# Patient Record
Sex: MALE | Race: WHITE | NOT HISPANIC OR LATINO | Employment: FULL TIME | URBAN - METROPOLITAN AREA
[De-identification: names, ages, dates, MRNs, and addresses within clinical notes are randomized per-mention and may not be internally consistent; named-entity substitution may affect disease eponyms.]

---

## 2018-10-26 ENCOUNTER — TRANSFERRED RECORDS (OUTPATIENT)
Dept: HEALTH INFORMATION MANAGEMENT | Facility: CLINIC | Age: 33
End: 2018-10-26

## 2018-10-26 LAB
CHOLEST SERPL-MCNC: 267 MG/DL (ref 0–199)
CREAT SERPL-MCNC: 1.12 MG/DL (ref 0.73–1.18)
GFR SERPL CREATININE-BSD FRML MDRD: >60 ML/MIN/1.73M2
GLUCOSE SERPL-MCNC: 100 MG/DL (ref 70–100)
HBA1C MFR BLD: 5.3 % (ref 4–5.6)
HDLC SERPL-MCNC: 57 MG/DL
LDLC SERPL CALC-MCNC: 181 MG/DL (ref 19–130)
NONHDLC SERPL-MCNC: 210 MG/DL (ref 0–159)
POTASSIUM SERPL-SCNC: 4.3 MMOL/L (ref 3.5–5.2)
TRIGL SERPL-MCNC: 144 MG/DL (ref 4–149)

## 2019-03-08 ENCOUNTER — OFFICE VISIT (OUTPATIENT)
Dept: FAMILY MEDICINE | Facility: CLINIC | Age: 34
End: 2019-03-08
Payer: COMMERCIAL

## 2019-03-08 VITALS
OXYGEN SATURATION: 97 % | HEIGHT: 73 IN | SYSTOLIC BLOOD PRESSURE: 138 MMHG | DIASTOLIC BLOOD PRESSURE: 89 MMHG | BODY MASS INDEX: 31.05 KG/M2 | TEMPERATURE: 96.5 F | WEIGHT: 234.3 LBS | HEART RATE: 94 BPM

## 2019-03-08 DIAGNOSIS — F31.81 BIPOLAR 2 DISORDER (H): ICD-10-CM

## 2019-03-08 DIAGNOSIS — Z00.00 ROUTINE GENERAL MEDICAL EXAMINATION AT A HEALTH CARE FACILITY: Primary | ICD-10-CM

## 2019-03-08 PROCEDURE — 99385 PREV VISIT NEW AGE 18-39: CPT | Performed by: INTERNAL MEDICINE

## 2019-03-08 RX ORDER — QUETIAPINE FUMARATE 50 MG/1
100 TABLET, FILM COATED ORAL AT BEDTIME
Qty: 60 TABLET | Refills: 1 | Status: SHIPPED | OUTPATIENT
Start: 2019-03-08 | End: 2019-05-07

## 2019-03-08 RX ORDER — QUETIAPINE FUMARATE 50 MG/1
50 TABLET, FILM COATED ORAL 2 TIMES DAILY
COMMUNITY

## 2019-03-08 SDOH — HEALTH STABILITY: MENTAL HEALTH: HOW OFTEN DO YOU HAVE A DRINK CONTAINING ALCOHOL?: 2-3 TIMES A WEEK

## 2019-03-08 ASSESSMENT — ANXIETY QUESTIONNAIRES
1. FEELING NERVOUS, ANXIOUS, OR ON EDGE: MORE THAN HALF THE DAYS
2. NOT BEING ABLE TO STOP OR CONTROL WORRYING: MORE THAN HALF THE DAYS
6. BECOMING EASILY ANNOYED OR IRRITABLE: MORE THAN HALF THE DAYS
3. WORRYING TOO MUCH ABOUT DIFFERENT THINGS: MORE THAN HALF THE DAYS
7. FEELING AFRAID AS IF SOMETHING AWFUL MIGHT HAPPEN: NOT AT ALL
IF YOU CHECKED OFF ANY PROBLEMS ON THIS QUESTIONNAIRE, HOW DIFFICULT HAVE THESE PROBLEMS MADE IT FOR YOU TO DO YOUR WORK, TAKE CARE OF THINGS AT HOME, OR GET ALONG WITH OTHER PEOPLE: SOMEWHAT DIFFICULT
GAD7 TOTAL SCORE: 12
5. BEING SO RESTLESS THAT IT IS HARD TO SIT STILL: SEVERAL DAYS

## 2019-03-08 ASSESSMENT — MIFFLIN-ST. JEOR: SCORE: 2061.66

## 2019-03-08 ASSESSMENT — PATIENT HEALTH QUESTIONNAIRE - PHQ9
5. POOR APPETITE OR OVEREATING: NEARLY EVERY DAY
SUM OF ALL RESPONSES TO PHQ QUESTIONS 1-9: 12

## 2019-03-08 NOTE — PROGRESS NOTES
SUBJECTIVE:   CC: Eddy Dey is an 33 year old male who presents for preventive health visit.       New patient evaluation    Rectal bleeding  Patient reports 2 years of intermittent diarrhea alternating with constipation with associated bright red blood on the toilet tissue with more frequent bowel movements.  He has no  associated abdominal pain or discomfort  Bipolar disorder  Alternating anxiety/depression  Functioning well    Family history  Father-bipolar disorder  Mother-hep C  Brother-depression    Healthy Habits:    Do you get at least three servings of calcium containing foods daily (dairy, green leafy vegetables, etc.)? yes    Amount of exercise or daily activities, outside of work: 3-4 day(s) per week    Problems taking medications regularly No    Medication side effects: No    Have you had an eye exam in the past two years? no    Do you see a dentist twice per year? yes    Do you have sleep apnea, excessive snoring or daytime drowsiness?no        Today's PHQ-2 Score: No flowsheet data found.    Abuse: Current or Past(Physical, Sexual or Emotional)- No  Do you feel safe in your environment? Yes    Social History     Tobacco Use     Smoking status: Not on file   Substance Use Topics     Alcohol use: Not on file     If you drink alcohol do you typically have >3 drinks per day or >7 drinks per week?                       Last PSA: No results found for: PSA    Reviewed orders with patient. Reviewed health maintenance and updated orders accordingly - Yes  Current Outpatient Medications   Medication Sig Dispense Refill     QUEtiapine (SEROQUEL) 50 MG tablet Take 50 mg by mouth 2 times daily       QUEtiapine (SEROQUEL) 50 MG tablet Take 2 tablets (100 mg) by mouth At Bedtime for 30 doses 60 tablet 1     Not on File    Reviewed and updated as needed this visit by clinical staff         Reviewed and updated as needed this visit by Provider            ROS:  CONSTITUTIONAL: NEGATIVE for fever, chills,  "change in weight  INTEGUMENTARY/SKIN: NEGATIVE for worrisome rashes, moles or lesions  EYES: NEGATIVE for vision changes or irritation  ENT: NEGATIVE for ear, mouth and throat problems  RESP: NEGATIVE for significant cough or SOB  CV: NEGATIVE for chest pain, palpitations or peripheral edema  Seasonal activity however he is generally sedentary.  He reports occasional stinging chest discomfort without associated activity which is probably related with breathing and stress  GI: NEGATIVE for nausea, abdominal pain, heartburn, or change in bowel habits, stress-induced diarrhea, alternating diarrhea and constipation   male: negative for dysuria, hematuria, decreased urinary stream, erectile dysfunction, urethral discharge  MUSCULOSKELETAL: NEGATIVE for significant arthralgias or myalgia except and shoulders  NEURO: NEGATIVE for weakness, dizziness or paresthesias  PSYCHIATRIC: NEGATIVE for changes in mood or affect    OBJECTIVE:   There were no vitals taken for this visit.   /89 (BP Location: Left arm, Patient Position: Sitting, Cuff Size: Adult Large)   Pulse 94   Temp 96.5  F (35.8  C) (Oral)   Ht 1.854 m (6' 1\")   Wt 106.3 kg (234 lb 4.8 oz)   SpO2 97%   BMI 30.91 kg/m      EXAM:  GENERAL: healthy, alert and no distress  EYES: Eyes grossly normal to inspection, PERRL and conjunctivae and sclerae normal  HENT: ear canals and TM's normal, nose and mouth without ulcers or lesions  NECK: no adenopathy, no asymmetry, masses, or scars and thyroid normal to palpation  RESP: lungs clear to auscultation - no rales, rhonchi or wheezes  CV: regular rate and rhythm, normal S1 S2, no S3 or S4, no murmur, click or rub, no peripheral edema and peripheral pulses strong  ABDOMEN: soft, nontender, no hepatosplenomegaly, no masses and bowel sounds normal   there is no inguinal masses or adenopathy or signs of an inguinal hernia testicles were distended bilaterally  Rectal exam his anus was normal there are no palpable " masses or alexandre hemorrhoids in the joana-rectal area his prostate was 1+ and smooth  MS: no gross musculoskeletal defects noted, no edema  SKIN: no suspicious lesions or rashes  NEURO: Normal strength and tone, mentation intact and speech normal  PSYCH: mentation appears normal, affect normal/bright        ASSESSMENT/PLAN:   1. Routine general medical examination at a health care facility    - **CBC with platelets FUTURE anytime; Future  - **Comprehensive metabolic panel FUTURE anytime; Future  - Lipid panel reflex to direct LDL Fasting; Future    2. Bipolar 2 disorder (H)    - QUEtiapine (SEROQUEL) 50 MG tablet; Take 2 tablets (100 mg) by mouth At Bedtime for 30 doses  Dispense: 60 tablet; Refill: 1    COUNSELING:  Reevaluate after lab reports are available.  Patient will return to clinic fasting for lab studies    BP Readings from Last 1 Encounters:   No data found for BP     There is no height or weight on file to calculate BMI.           has no tobacco history on file.      Counseling Resources:  ATP IV Guidelines  Pooled Cohorts Equation Calculator  FRAX Risk Assessment  ICSI Preventive Guidelines  Dietary Guidelines for Americans, 2010  USDA's MyPlate  ASA Prophylaxis  Lung CA Screening    Kvng Shah MD  [facility]

## 2019-03-09 ASSESSMENT — ANXIETY QUESTIONNAIRES: GAD7 TOTAL SCORE: 12

## 2019-03-14 ENCOUNTER — TELEPHONE (OUTPATIENT)
Dept: FAMILY MEDICINE | Facility: CLINIC | Age: 34
End: 2019-03-14

## 2019-03-14 NOTE — TELEPHONE ENCOUNTER
Dr. Shah-     Will this be a procedure only colonscopy or referral to GI.     Please advise,     Thank you,   Tessa BARTLETT RN

## 2019-03-14 NOTE — TELEPHONE ENCOUNTER
Reason for Call: Request for an order or referral:    Order or referral being requested: Colonoscopy.  And he is wondering if there are any other labs Dr. Shah wanted to order    Date needed: as soon as possible    Has the patient been seen by the PCP for this problem? YES    Additional comments: per the discussion at his physical    Phone number Patient can be reached at:  Home number on file [phone] (home)    Best Time:  any    Can we leave a detailed message on this number?  YES    Call taken on 3/14/2019 at 1:13 PM by Seda Garcia

## 2019-04-10 ENCOUNTER — TELEPHONE (OUTPATIENT)
Dept: FAMILY MEDICINE | Facility: CLINIC | Age: 34
End: 2019-04-10

## 2019-04-10 DIAGNOSIS — K62.5 RECTAL BLEEDING: Primary | ICD-10-CM

## 2019-04-10 NOTE — TELEPHONE ENCOUNTER
Reason for Call:  Other call back    Detailed comments:  ordered labs, but PT had just had some labs done, and PT told me that DR was going to check to see if these previous labs would suffice for what he ordered.     Phone Number Patient can be reached at: Cell number on file:    Telephone Information:   Mobile [phone]       Best Time: any    Can we leave a detailed message on this number? YES    Call taken on 4/10/2019 at 12:23 PM by Haylie Bonilla

## 2019-04-16 NOTE — TELEPHONE ENCOUNTER
Pended colonoscopy for Dr. Shah, Please sign today.     Still waiting for outside labs from [facility]. Pt did already sign all release forms.    Neha Dorsey RN

## 2019-04-16 NOTE — TELEPHONE ENCOUNTER
Patient called said this is his 4th call trying to talk with Dr. Shah or a nurse  Regarding labs and Colonoscopy.

## 2019-05-07 DIAGNOSIS — F31.81 BIPOLAR 2 DISORDER (H): ICD-10-CM

## 2019-05-07 NOTE — TELEPHONE ENCOUNTER
"QUEtiapine (SEROQUEL) 50 MG tablet 60 tablet 1 3/8/2019     Last Written Prescription Date:  3/8/19  Last Fill Quantity: 60,  # refills: 1   Last office visit: 3/8/2019 with prescribing provider:  Alyssa   Future Office Visit:  None    Requested Prescriptions   Pending Prescriptions Disp Refills     QUEtiapine (SEROQUEL) 50 MG tablet [Pharmacy Med Name: QUEtiapine Fumarate Oral Tablet 50 MG] 60 tablet 0     Sig: Take 2 tablets (100 mg) by mouth At Bedtime       Antipsychotic Medications Failed - 5/7/2019  7:01 AM        Failed - Lipid panel on file within the past 12 months     No lab results found.            Failed - CBC on file in past 12 months     No lab results found.              Failed - A1c or Glucose on file in past 12 months     No lab results found.    Please review patients last 3 weights. If a weight gain of >10 lbs exists, you may refill the prescription once after instructing the patient to schedule an appointment within the next 30 days.    Wt Readings from Last 3 Encounters:   03/08/19 106.3 kg (234 lb 4.8 oz)             Passed - Blood pressure under 140/90 in past 12 months     BP Readings from Last 3 Encounters:   03/08/19 138/89                 Passed - Patient is 12 years of age or older        Passed - Heart Rate on file within past 12 months     Pulse Readings from Last 3 Encounters:   03/08/19 94               Passed - Medication is active on med list        Passed - Recent (6 mo) or future (30 days) visit within the authorizing provider's specialty     Patient had office visit in the last 6 months or has a visit in the next 30 days with authorizing provider or within the authorizing provider's specialty.  See \"Patient Info\" tab in inbasket, or \"Choose Columns\" in Meds & Orders section of the refill encounter.            [facility] Follow-up to PHQ 3/8/2019   PHQ-9 9. Suicide Ideation past 2 weeks More than half the days         "

## 2019-05-08 NOTE — TELEPHONE ENCOUNTER
Routing refill request to provider for review/approval because:  Labs not current in Epic ([facility]- [facility] labs are scanned):  Lipid, CBC, A1C/Glucose       Please review and authorize if appropriate,     Thank you,   Tessa JUAREZ RN

## 2019-05-09 RX ORDER — QUETIAPINE FUMARATE 50 MG/1
TABLET, FILM COATED ORAL
Qty: 60 TABLET | Refills: 1 | Status: SHIPPED | OUTPATIENT
Start: 2019-05-09

## 2019-05-14 DIAGNOSIS — F31.81 BIPOLAR 2 DISORDER (H): ICD-10-CM

## 2019-05-15 RX ORDER — QUETIAPINE FUMARATE 50 MG/1
100 TABLET, FILM COATED ORAL AT BEDTIME
Qty: 60 TABLET | Refills: 1 | Status: SHIPPED | OUTPATIENT
Start: 2019-05-15 | End: 2019-07-15

## 2019-05-15 NOTE — TELEPHONE ENCOUNTER
"Unsure if Rx faxed 5/9/2019  Pharmacy requesting refill again  eRx'd instead  Alysha GONZÁLES RN    Last Written Prescription Date:  5/6/2019 (local printed)  Last Fill Quantity: 60,  # refills: 1   Last office visit: 3/8/2019 with prescribing provider:     Future Office Visit:    Requested Prescriptions   Pending Prescriptions Disp Refills     QUEtiapine (SEROQUEL) 50 MG tablet [Pharmacy Med Name: QUEtiapine Fumarate Oral Tablet 50 MG] 60 tablet 0     Sig: Take 2 tablets (100 mg) by mouth At Bedtime       Antipsychotic Medications Failed - 5/14/2019  8:06 PM        Failed - CBC on file in past 12 months     No lab results found.              Passed - Blood pressure under 140/90 in past 12 months     BP Readings from Last 3 Encounters:   03/08/19 138/89                 Passed - Patient is 12 years of age or older        Passed - Lipid panel on file within the past 12 months     Recent Labs   Lab Test 10/26/18   CHOL 267*   TRIG 144   HDL 57   *   NHDL 210*               Passed - Heart Rate on file within past 12 months     Pulse Readings from Last 3 Encounters:   03/08/19 94               Passed - A1c or Glucose on file in past 12 months     Recent Labs   Lab Test 10/26/18      A1C 5.3       Please review patients last 3 weights. If a weight gain of >10 lbs exists, you may refill the prescription once after instructing the patient to schedule an appointment within the next 30 days.    Wt Readings from Last 3 Encounters:   03/08/19 106.3 kg (234 lb 4.8 oz)             Passed - Medication is active on med list        Passed - Recent (6 mo) or future (30 days) visit within the authorizing provider's specialty     Patient had office visit in the last 6 months or has a visit in the next 30 days with authorizing provider or within the authorizing provider's specialty.  See \"Patient Info\" tab in inbasket, or \"Choose Columns\" in Meds & Orders section of the refill encounter.              "

## 2019-05-17 ENCOUNTER — HOSPITAL ENCOUNTER (OUTPATIENT)
Facility: CLINIC | Age: 34
Discharge: HOME | End: 2019-05-17
Attending: SURGERY | Admitting: SURGERY
Payer: COMMERCIAL

## 2019-05-17 VITALS
HEIGHT: 74 IN | RESPIRATION RATE: 17 BRPM | SYSTOLIC BLOOD PRESSURE: 125 MMHG | BODY MASS INDEX: 29.52 KG/M2 | DIASTOLIC BLOOD PRESSURE: 93 MMHG | WEIGHT: 230 LBS | HEART RATE: 70 BPM | OXYGEN SATURATION: 97 %

## 2019-05-17 LAB
COLONOSCOPY: NORMAL
GI HISTORY AND PHYSICALL: NORMAL

## 2019-05-17 PROCEDURE — 88305 TISSUE EXAM BY PATHOLOGIST: CPT | Mod: 26 | Performed by: SURGERY

## 2019-05-17 PROCEDURE — 88305 TISSUE EXAM BY PATHOLOGIST: CPT | Performed by: SURGERY

## 2019-05-17 PROCEDURE — 45380 COLONOSCOPY AND BIOPSY: CPT | Performed by: SURGERY

## 2019-05-17 PROCEDURE — G0500 MOD SEDAT ENDO SERVICE >5YRS: HCPCS | Performed by: SURGERY

## 2019-05-17 PROCEDURE — 25000128 H RX IP 250 OP 636: Performed by: SURGERY

## 2019-05-17 RX ORDER — ONDANSETRON 4 MG/1
4 TABLET, ORALLY DISINTEGRATING ORAL EVERY 6 HOURS PRN
Status: DISCONTINUED | OUTPATIENT
Start: 2019-05-17 | End: 2019-05-17 | Stop reason: HOSPADM

## 2019-05-17 RX ORDER — NALOXONE HYDROCHLORIDE 0.4 MG/ML
.1-.4 INJECTION, SOLUTION INTRAMUSCULAR; INTRAVENOUS; SUBCUTANEOUS
Status: DISCONTINUED | OUTPATIENT
Start: 2019-05-17 | End: 2019-05-17 | Stop reason: HOSPADM

## 2019-05-17 RX ORDER — FLUMAZENIL 0.1 MG/ML
0.2 INJECTION, SOLUTION INTRAVENOUS
Status: DISCONTINUED | OUTPATIENT
Start: 2019-05-17 | End: 2019-05-17 | Stop reason: HOSPADM

## 2019-05-17 RX ORDER — LIDOCAINE 40 MG/G
CREAM TOPICAL
Status: DISCONTINUED | OUTPATIENT
Start: 2019-05-17 | End: 2019-05-17 | Stop reason: HOSPADM

## 2019-05-17 RX ORDER — ONDANSETRON 2 MG/ML
4 INJECTION INTRAMUSCULAR; INTRAVENOUS EVERY 6 HOURS PRN
Status: DISCONTINUED | OUTPATIENT
Start: 2019-05-17 | End: 2019-05-17 | Stop reason: HOSPADM

## 2019-05-17 RX ORDER — ONDANSETRON 2 MG/ML
4 INJECTION INTRAMUSCULAR; INTRAVENOUS
Status: DISCONTINUED | OUTPATIENT
Start: 2019-05-17 | End: 2019-05-17 | Stop reason: HOSPADM

## 2019-05-17 RX ORDER — FENTANYL CITRATE 50 UG/ML
INJECTION, SOLUTION INTRAMUSCULAR; INTRAVENOUS PRN
Status: DISCONTINUED | OUTPATIENT
Start: 2019-05-17 | End: 2019-05-17 | Stop reason: HOSPADM

## 2019-05-17 ASSESSMENT — MIFFLIN-ST. JEOR: SCORE: 2058.02

## 2019-05-20 LAB — COPATH REPORT: NORMAL

## 2019-07-15 DIAGNOSIS — F31.81 BIPOLAR 2 DISORDER (H): ICD-10-CM

## 2019-07-17 RX ORDER — QUETIAPINE FUMARATE 50 MG/1
TABLET, FILM COATED ORAL
Qty: 60 TABLET | Refills: 0 | Status: SHIPPED | OUTPATIENT
Start: 2019-07-17

## 2019-07-17 NOTE — TELEPHONE ENCOUNTER
"Dr. Shah,  Routing refill request to provider for review/approval because:  BP = 125/93  Last Rx 5/15/19 for #60 with 1 refill.  Last OV 3/8/19  Thanks,  Khadijah Fuller RN                  Requested Prescriptions   Pending Prescriptions Disp Refills     QUEtiapine (SEROQUEL) 50 MG tablet [Pharmacy Med Name: QUEtiapine Fumarate Oral Tablet 50 MG] 60 tablet 0     Sig: Take 2 tablets (100 mg) by mouth At Bedtime       Antipsychotic Medications Failed - 7/15/2019  1:32 PM        Failed - Blood pressure under 140/90 in past 12 months     BP Readings from Last 3 Encounters:   05/17/19 (!) 125/93   03/08/19 138/89                 Failed - CBC on file in past 12 months     No lab results found.              Passed - Patient is 12 years of age or older        Passed - Lipid panel on file within the past 12 months     Recent Labs   Lab Test 10/26/18   CHOL 267*   TRIG 144   HDL 57   *   NHDL 210*               Passed - Heart Rate on file within past 12 months     Pulse Readings from Last 3 Encounters:   05/17/19 70   03/08/19 94               Passed - A1c or Glucose on file in past 12 months     Recent Labs   Lab Test 10/26/18      A1C 5.3       Please review patients last 3 weights. If a weight gain of >10 lbs exists, you may refill the prescription once after instructing the patient to schedule an appointment within the next 30 days.    Wt Readings from Last 3 Encounters:   05/17/19 104.3 kg (230 lb)   03/08/19 106.3 kg (234 lb 4.8 oz)             Passed - Medication is active on med list        Passed - Recent (6 mo) or future (30 days) visit within the authorizing provider's specialty     Patient had office visit in the last 6 months or has a visit in the next 30 days with authorizing provider or within the authorizing provider's specialty.  See \"Patient Info\" tab in inbasket, or \"Choose Columns\" in Meds & Orders section of the refill encounter.              "

## 2019-07-17 NOTE — TELEPHONE ENCOUNTER
Rx Ready- Faxed to [facility] Unit Coordinator

## 2023-02-22 ENCOUNTER — HOSPITAL ENCOUNTER (EMERGENCY)
Facility: CLINIC | Age: 38
Discharge: HOME | End: 2023-02-22
Payer: COMMERCIAL

## 2023-02-22 VITALS
HEART RATE: 95 BPM | OXYGEN SATURATION: 99 % | TEMPERATURE: 98.3 F | RESPIRATION RATE: 20 BRPM | DIASTOLIC BLOOD PRESSURE: 110 MMHG | SYSTOLIC BLOOD PRESSURE: 178 MMHG

## 2023-02-22 DIAGNOSIS — H92.02 LEFT EAR PAIN: ICD-10-CM

## 2023-02-22 DIAGNOSIS — R68.84 JAW PAIN: ICD-10-CM

## 2023-02-22 PROCEDURE — G0463 HOSPITAL OUTPT CLINIC VISIT: HCPCS

## 2023-02-22 PROCEDURE — 99203 OFFICE O/P NEW LOW 30 MIN: CPT

## 2023-02-22 ASSESSMENT — ACTIVITIES OF DAILY LIVING (ADL): ADLS_ACUITY_SCORE: 35

## 2023-02-22 NOTE — ED PROVIDER NOTES
"Chief Complaint:   Chief Complaint   Patient presents with     Otalgia     Left side x 2 weeks, now going down into jaw.          HPI:   Eddy Dey is a 37 year old male presenting to the Urgent Care complaining of left pain that started 2 week(s) ago.  There is associated jaw pain for one week. Reports worsening pain over the past few days.  There is not associated drainage, congestion, rhinorrhea, coryza, sore throat, swollen glands, fever, irritability, cough, vomiting, diarrhea, nausea and abdominal discomfort.  States he has not tried much over-the-counter pain management such as total ibuprofen as he \"does not like to take medications\".  He does report that there has been increased stress and he does believe that he grinds his teeth.    Medications:   Current Outpatient Medications   Medication Sig Dispense Refill     QUEtiapine (SEROQUEL) 50 MG tablet Take 2 tablets (100 mg) by mouth At Bedtime 60 tablet 0     QUEtiapine (SEROQUEL) 50 MG tablet Take 2 tablets (100 mg) by mouth At Bedtime 60 tablet 1     QUEtiapine (SEROQUEL) 50 MG tablet Take 50 mg by mouth 2 times daily         Allergies:   No Known Allergies    Medications updated and reviewed.  Past, family and surgical history is updated and reviewed in the record.    Review of Systems:  Ears: see HPI  Nose: see HPI  Throat/Mouth:see HPI    Physical Exam:   BP (!) 178/110   Pulse 95   Temp 98.3  F (36.8  C) (Tympanic)   Resp 20   SpO2 99%    General: healthy, alert and cooperative  Head: Normocephalic. No masses, lesions, tenderness or abnormalities  Eyes: negative  Ears: normal  Nose: normal  Mouth/Throat: normal  Jaw: Tenderness with palpation along the temporal mandibular joint, reports increased discomfort with opening and closing mouth.  Lungs: chest clear to IPPA, no tachypnea, retractions or cyanosis and S1, S2 normal, no murmur, no gallop, rate regular    Assessment:  Ear pain, left; jaw pain (suspect TMJ)      Plan:   follow up as " needed and recommend seeing a dentist for management of jaw pain that I suspect is TMJ.  Other symptomatic therapy suggested:  acetaminophen, ibuprofen, also try over-the-counter mouthguard.  Return to the ER with increased pain, fevers or any other concerns.    Condition on disposition: Stable      Disclaimer:  This note consists of symbols derived from keyboarding, dictation and/or voice recognition software.  As a result, there may be errors in the script that have gone undetected.  Please consider this when interpreting information found in this chart.       Denver Armstrong APRN CNP  02/22/23 [id number]

## 2023-02-22 NOTE — DISCHARGE INSTRUCTIONS
Recommend you follow-up with a dentist for further evaluation and treatment of your jaw pain.  Suspect this may be related to a TMJ disorder.  Please return for worsening symptoms or any other new concerns.  You may try over-the-counter mouthguard to see if this helps with your symptoms.  Otherwise Tylenol and ibuprofen in the meantime for symptomatic pain treatment.    Many of these clinics offer a sliding fee option for patients that qualify, and see patients on a walk-in or same day basis. Please call each clinic directly. As services, hours, fees and policies vary greatly.          [facility], [facility]  [phone]  Sees no insurance  [facility], [place]  [phone]  Preventive services only  [facility] (mult loc) [phone]  [facility]    ([facility]), [facility]  [phone]  [facility]       [phone]  Preventive services only  [facility]  [phone]  Accepts MA & sees no ins  [facility],      Accepts MA & sees no ins   [place]   [phone]; [phone]  [facility]   Accepts MA & sees no ins       [phone]  [facility]ental [facility]  [phone]   Accepts MA emergencies  [facility] [phone]  [facility],     Accepts MA   [place]   [phone]    [facility] [phone]  Accepts MA & sees no ins   [facility]   Dental Clinic    [phone]  [facility], [facility]  [phone]   [facility] [phone]  [facility]  Preventive services only   [place]   [phone]  [facility] [phone]  [facility], [place]  [phone]  Same day [facility] [phone]  Same day [facility],      Same day [facility]  [place]   [phone]    [facility], [facility] [phone]  Free clinic, walk-in only  [facility] (multiple locations) [phone]      [facility] [phone]    [facility] [phone]  Free clinic, walk-in only  [facility]  [phone]  [facility] [phone] (adults)       [phone] (children)  [facility] [phone]    Also, referral service for low cost dental and healthcare: [phone]  And [phone]

## 2024-08-05 ENCOUNTER — HOSPITAL ENCOUNTER (EMERGENCY)
Facility: CLINIC | Age: 39
Discharge: HOME | End: 2024-08-05
Attending: NURSE PRACTITIONER | Admitting: NURSE PRACTITIONER
Payer: COMMERCIAL

## 2024-08-05 VITALS
TEMPERATURE: 100 F | OXYGEN SATURATION: 98 % | HEART RATE: 96 BPM | DIASTOLIC BLOOD PRESSURE: 98 MMHG | SYSTOLIC BLOOD PRESSURE: 148 MMHG | RESPIRATION RATE: 18 BRPM

## 2024-08-05 DIAGNOSIS — J06.9 VIRAL UPPER RESPIRATORY ILLNESS: ICD-10-CM

## 2024-08-05 DIAGNOSIS — J02.0 ACUTE STREPTOCOCCAL PHARYNGITIS: ICD-10-CM

## 2024-08-05 LAB
GROUP A STREP BY PCR: DETECTED
SARS-COV-2 RNA RESP QL NAA+PROBE: NEGATIVE

## 2024-08-05 PROCEDURE — 87635 SARS-COV-2 COVID-19 AMP PRB: CPT | Performed by: NURSE PRACTITIONER

## 2024-08-05 PROCEDURE — 87651 STREP A DNA AMP PROBE: CPT | Performed by: NURSE PRACTITIONER

## 2024-08-05 PROCEDURE — 99213 OFFICE O/P EST LOW 20 MIN: CPT | Performed by: NURSE PRACTITIONER

## 2024-08-05 PROCEDURE — G0463 HOSPITAL OUTPT CLINIC VISIT: HCPCS | Performed by: NURSE PRACTITIONER

## 2024-08-05 RX ORDER — AMOXICILLIN 500 MG/1
500 CAPSULE ORAL 2 TIMES DAILY
Qty: 14 CAPSULE | Refills: 0 | Status: SHIPPED | OUTPATIENT
Start: 2024-08-05 | End: 2024-08-12

## 2024-08-05 RX ORDER — DEXTROAMPHETAMINE SACCHARATE, AMPHETAMINE ASPARTATE MONOHYDRATE, DEXTROAMPHETAMINE SULFATE AND AMPHETAMINE SULFATE 5; 5; 5; 5 MG/1; MG/1; MG/1; MG/1
1 CAPSULE, EXTENDED RELEASE ORAL DAILY
COMMUNITY
Start: 2024-07-29 | End: 2024-08-28

## 2024-08-05 ASSESSMENT — ACTIVITIES OF DAILY LIVING (ADL): ADLS_ACUITY_SCORE: 35

## 2024-08-05 ASSESSMENT — COLUMBIA-SUICIDE SEVERITY RATING SCALE - C-SSRS
6. HAVE YOU EVER DONE ANYTHING, STARTED TO DO ANYTHING, OR PREPARED TO DO ANYTHING TO END YOUR LIFE?: NO
2. HAVE YOU ACTUALLY HAD ANY THOUGHTS OF KILLING YOURSELF IN THE PAST MONTH?: NO
1. IN THE PAST MONTH, HAVE YOU WISHED YOU WERE DEAD OR WISHED YOU COULD GO TO SLEEP AND NOT WAKE UP?: NO

## 2024-08-05 NOTE — DISCHARGE INSTRUCTIONS
Testing results for COVID and strep throat are pending we will contact you as soon as you see your available.  Testing for strep throat was positive today will order amoxicillin twice daily for 7 days if your symptoms or not improving despite recommended treatment plan please return to primary care or return here for further evaluation.

## 2024-08-05 NOTE — ED PROVIDER NOTES
ED Provider Note  [facility]      History     Chief Complaint   Patient presents with    Cough     X 2weeks. Oldest child had strep 2 weeks ago and he started getting a sore throat and then has gotten worse. Now has body aches, fatigue, has been having chills. Cough.      HPI  Eddy Dey is a 38 year old male who reports having new onset of bodyaches, fatigue, cough chills and sore throat that began worsening approximately 2 days ago.  Reports that he has had a fever this morning highest being 100.0.  Denies any nausea, vomiting, diarrhea or abdominal pain.  Reports that he had family members that were sick with strep throat approximately 2 weeks ago and then he had a sore throat following this but this resolved and has returned with the body aches, fever, chills and cough.  Patient has a history of seasonal allergies and is not treating with any medications at this time.  Request testing today for COVID and strep throat.            Allergies:  No Known Allergies    Problem List:    There are no problems to display for this patient.       Past Medical History:    Past Medical History:   Diagnosis Date    Bipolar 1 disorder (H)        Past Surgical History:    Past Surgical History:   Procedure Laterality Date    COLONOSCOPY N/A 5/17/2019    Procedure: COLONOSCOPY, WITH POLYPECTOMY AND BIOPSY;  Surgeon: Mac Ivory MD;  Location:  GI       Family History:    No family history on file.    Social History:  Marital Status:   [2]  Social History     Tobacco Use    Smoking status: Never    Smokeless tobacco: Never   Substance Use Topics    Alcohol use: Yes     Comment: 2 day    Drug use: Yes     Types: Marijuana     Comment: stopped because looking for job        Medications:    amphetamine-dextroamphetamine (ADDERALL XR) 20 MG 24 hr capsule  QUEtiapine (SEROQUEL) 50 MG tablet  QUEtiapine (SEROQUEL) 50 MG tablet  QUEtiapine (SEROQUEL) 50 MG tablet          Review of  Systems  A medically appropriate review of systems was performed with pertinent positives and negatives noted in the HPI, and all other systems negative.    Physical Exam   Patient Vitals for the past 24 hrs:   BP Temp Temp src Pulse Resp SpO2   08/05/24 1811 (!) 148/98 100  F (37.8  C) Tympanic 96 18 98 %          Physical Exam  General: alert and in no acute distress on arrival  Ears/Nose/Throat: ENT: Left Ear: TM intact, middle ear is not erythremic, no purulence, canal patent. Right Ear: TM intact, middle ear is not erythremic, no purulence, canal patent. Nose: No erythema or edema patent nostrils bilateral. Throat: midline uvula, erythremic, non-enlarged tonsils, without exudate.  No cervical adenopathy.   Head: atraumatic, normocephalic  Eyes:  non-traumatic.  Left Eyes: Non-reddened conjunctiva, no icterus, noninjected, normal pupillary response to light. Normal extraocular movement.  Right eye: Non-reddened conjunctiva, no icterus, noninjected, normal pupillary response to light. Normal extraocular movement bilateral. No drainage present.   Lungs:  nonlabored, occasional scattered rhonchi in upper lobes that clears with cough clear middle and bases bilateral.  CV: Regular rate and rhythm, extremities warm and perfused  Abd: nondistended, nontender  Skin: no rashes, no diaphoresis and skin color normal  Neuro: Patient awake, alert, speech is fluent, focal deficits  Psychiatric: affect/mood normal, appropriate historian.      ED Course                 Procedures                    Results for orders placed or performed during the hospital encounter of 08/05/24 (from the past 24 hour(s))   Group A Streptococcus PCR Throat Swab    Specimen: Throat; Swab   Result Value Ref Range    Group A strep by PCR Detected (A) Not Detected    Narrative    The Xpert Xpress Strep A test, performed on the [employer], is a rapid, qualitative in vitro diagnostic test for the detection of Streptococcus pyogenes  (Group A ß-hemolytic Streptococcus, Strep A) in throat swab specimens from patients with signs and symptoms of pharyngitis. The Xpert Xpress Strep A test can be used as an aid in the diagnosis of Group A Streptococcal pharyngitis. The assay is not intended to monitor treatment for Group A Streptococcus infections. The Xpert Xpress Strep A test utilizes an automated real-time polymerase chain reaction (PCR) to detect Streptococcus pyogenes DNA.       MEDICATIONS GIVEN IN THE EMERGENCY DEPARTMENT:  Medications - No data to display             Assessments & Plan (with Medical Decision Making)  38 year old male who presents to the Urgent Care for evaluation of viral upper respiratory illness, negative testing for COVID.  Encourage increase oral fluid intake manage fevers or pain with ibuprofen or Tylenol.  Testing for acute streptococcal pharyngitis is positive, ordered amoxicillin twice daily for 7 days.  Advised follow-up with primary care if symptoms or not improving or if symptoms worsen despite recommended treatment plan advise further evaluation here.  New Prescriptions (1)      New  amoxicillin (AMOXIL) 500 MG capsule  Take 1 capsule (500 mg) by mouth 2 times daily for 7 days, Disp-14 capsule, R-0, E-Prescribe, Pharmacy: [facility] [address], Refills: 0 ordered, Ordered by Malena Freitas APRN CNP on 8/5/2024 at 1916           I have reviewed the nursing notes.    I have reviewed the findings, diagnosis, plan and need for follow up with the patient.            Final diagnoses:   Viral upper respiratory illness   Acute streptococcal pharyngitis       8/5/2024   [facility] EMERGENCY DEPT       Malena Freitas APRN CNP  08/05/24 2052

## 2024-11-02 ENCOUNTER — HEALTH MAINTENANCE LETTER (OUTPATIENT)
Age: 39
End: 2024-11-02

## (undated) RX ORDER — FENTANYL CITRATE 50 UG/ML
INJECTION, SOLUTION INTRAMUSCULAR; INTRAVENOUS
Status: DISPENSED
Start: 2019-05-17